# Patient Record
Sex: MALE | Race: WHITE | NOT HISPANIC OR LATINO | Employment: FULL TIME | URBAN - METROPOLITAN AREA
[De-identification: names, ages, dates, MRNs, and addresses within clinical notes are randomized per-mention and may not be internally consistent; named-entity substitution may affect disease eponyms.]

---

## 2019-04-24 PROBLEM — J30.9 ALLERGIC RHINITIS: Status: ACTIVE | Noted: 2019-04-24

## 2020-09-14 ENCOUNTER — LAB (OUTPATIENT)
Dept: LAB | Facility: HOSPITAL | Age: 25
End: 2020-09-14

## 2020-09-14 ENCOUNTER — TRANSCRIBE ORDERS (OUTPATIENT)
Dept: LAB | Facility: HOSPITAL | Age: 25
End: 2020-09-14

## 2020-09-14 DIAGNOSIS — F41.1 GENERALIZED ANXIETY DISORDER: ICD-10-CM

## 2020-09-14 DIAGNOSIS — F41.1 GENERALIZED ANXIETY DISORDER: Primary | ICD-10-CM

## 2020-09-14 LAB
AMPHET+METHAMPHET UR QL: NEGATIVE
AMPHETAMINES UR QL: NEGATIVE
BARBITURATES UR QL SCN: NEGATIVE
BENZODIAZ UR QL SCN: POSITIVE
BUPRENORPHINE SERPL-MCNC: NEGATIVE NG/ML
CANNABINOIDS SERPL QL: NEGATIVE
COCAINE UR QL: NEGATIVE
METHADONE UR QL SCN: NEGATIVE
OPIATES UR QL: NEGATIVE
OXYCODONE UR QL SCN: NEGATIVE
PCP UR QL SCN: NEGATIVE
PROPOXYPH UR QL: NEGATIVE
TRICYCLICS UR QL SCN: NEGATIVE

## 2020-09-14 PROCEDURE — 80306 DRUG TEST PRSMV INSTRMNT: CPT

## 2021-11-01 PROCEDURE — U0004 COV-19 TEST NON-CDC HGH THRU: HCPCS | Performed by: NURSE PRACTITIONER

## 2021-11-04 PROCEDURE — U0004 COV-19 TEST NON-CDC HGH THRU: HCPCS | Performed by: FAMILY MEDICINE

## 2022-05-17 ENCOUNTER — LAB (OUTPATIENT)
Dept: LAB | Facility: HOSPITAL | Age: 27
End: 2022-05-17

## 2022-05-17 ENCOUNTER — TRANSCRIBE ORDERS (OUTPATIENT)
Dept: LAB | Facility: HOSPITAL | Age: 27
End: 2022-05-17

## 2022-05-17 DIAGNOSIS — F31.81 BIPOLAR II DISORDER: Primary | ICD-10-CM

## 2022-05-17 DIAGNOSIS — F31.81 BIPOLAR II DISORDER: ICD-10-CM

## 2022-05-17 LAB
CHOLEST SERPL-MCNC: 200 MG/DL (ref 0–200)
HBA1C MFR BLD: 5 % (ref 4.8–5.6)
HDLC SERPL-MCNC: 55 MG/DL (ref 40–60)
LDLC SERPL CALC-MCNC: 131 MG/DL (ref 0–100)
LDLC/HDLC SERPL: 2.35 {RATIO}
TRIGL SERPL-MCNC: 78 MG/DL (ref 0–150)
VLDLC SERPL-MCNC: 14 MG/DL (ref 5–40)

## 2022-05-17 PROCEDURE — 80061 LIPID PANEL: CPT

## 2022-05-17 PROCEDURE — 36415 COLL VENOUS BLD VENIPUNCTURE: CPT

## 2022-05-17 PROCEDURE — 83036 HEMOGLOBIN GLYCOSYLATED A1C: CPT

## 2023-09-08 ENCOUNTER — LAB (OUTPATIENT)
Dept: LAB | Facility: HOSPITAL | Age: 28
End: 2023-09-08
Payer: COMMERCIAL

## 2023-09-08 ENCOUNTER — OFFICE VISIT (OUTPATIENT)
Dept: FAMILY MEDICINE CLINIC | Facility: CLINIC | Age: 28
End: 2023-09-08
Payer: COMMERCIAL

## 2023-09-08 VITALS
SYSTOLIC BLOOD PRESSURE: 148 MMHG | HEART RATE: 99 BPM | HEIGHT: 69 IN | WEIGHT: 189 LBS | DIASTOLIC BLOOD PRESSURE: 88 MMHG | BODY MASS INDEX: 27.99 KG/M2 | OXYGEN SATURATION: 96 %

## 2023-09-08 DIAGNOSIS — Z13.220 SCREENING, LIPID: ICD-10-CM

## 2023-09-08 DIAGNOSIS — Z11.59 ENCOUNTER FOR HCV SCREENING TEST FOR LOW RISK PATIENT: ICD-10-CM

## 2023-09-08 DIAGNOSIS — F31.81 BIPOLAR 2 DISORDER: ICD-10-CM

## 2023-09-08 DIAGNOSIS — R53.83 OTHER FATIGUE: ICD-10-CM

## 2023-09-08 DIAGNOSIS — Z11.4 SCREENING FOR HIV (HUMAN IMMUNODEFICIENCY VIRUS): ICD-10-CM

## 2023-09-08 DIAGNOSIS — Z00.00 ENCOUNTER FOR MEDICAL EXAMINATION TO ESTABLISH CARE: Primary | ICD-10-CM

## 2023-09-08 DIAGNOSIS — R79.89 ABNORMAL TSH: ICD-10-CM

## 2023-09-08 RX ORDER — LAMOTRIGINE 100 MG/1
200 TABLET ORAL DAILY
Qty: 60 TABLET | Refills: 5 | Status: SHIPPED | OUTPATIENT
Start: 2023-09-08

## 2023-09-08 RX ORDER — PROPRANOLOL HYDROCHLORIDE 10 MG/1
10 TABLET ORAL AS NEEDED
COMMUNITY

## 2023-09-08 NOTE — PROGRESS NOTES
"    Male Physical Note      Date: 2023   Patient Name: Luis Felipe Heaton  : 1995   MRN: [id number]     Chief Complaint:    Chief Complaint   Patient presents with    Annual Exam     fasting       History of Present Illness: Luis Felipe Heaton is a 27 y.o. male who is here today for annual exam/establish care.    HPI  Diagnosed with Bipolar Type 2 disorder 8 years ago.  States he was originally misdiagnosed with depression and started on Lexapro monotherapy.  This led to worsening depressive thoughts and ultimate hospitalization.  Was discharged same day and Lexapro was Dc'd.  Was then started on Lamictal which has worked well for him for the last several years.  Previously followed with [facility].  Within the past year he was tried on Latuda for a short period but came off earlier this year.  Denies any adverse effects from the Lamictal.  Feels like his mood symptoms are well controlled.  No further hospitalizations since that time.  Request that we take over prescribing medications.    Reports fatigue.  Ongoing issue w acute worsening over the last few months.  Experiences in the morning and worsens in the afternoon. Drinks \"a lot\" of caffeine during the day. Wakes up at 5:30 for cross fit and drinks a preworkout with 200 mg of caffeine. Then drinks iced coffee and another 1/2 pot of coffee at work.  In total gets between 400 to 500 mg of caffeine daily on average.  Reports that sleep is \"okay.\"  Occasionally has issues with both sleep onset and maintenance. Tries to get 7 to 8 hours most nights.  Will occasionally fall asleep while sitting at his desk at work.  Denies ever falling asleep when active or driving.  Reports that his father has a history of sleep issues and most recently saw sleep psychologist which he found helpful.  Patient does not feel that his fatigue is to the point of needing to see someone at this time.  Denies any personal or family history of anemia.  No signs and symptoms of " blood loss.    Subjective      Review of Systems:   Noted above in HPI    Past Medical History, Social History, Family History and Care Team were all reviewed with patient and updated as appropriate.     Medications:     Current Outpatient Medications:     lamoTRIgine (LaMICtal) 100 MG tablet, Take 2 tablets by mouth Daily., Disp: 60 tablet, Rfl: 5    propranolol (INDERAL) 10 MG tablet, Take 1 tablet by mouth As Needed., Disp: , Rfl:     Allergies:   Allergies   Allergen Reactions    Penicillins GI Intolerance       Immunizations:  Td/Tdap(Booster Q 10 yrs):  2016   Flu (Yearly):  Gets yearly flu vaccines.  Plans to get COVID booster.    Immunization History   Administered Date(s) Administered    COVID-19 (MODERNA) 1st,2nd,3rd Dose Monovalent 01/27/2021, 02/23/2021, 10/29/2021    COVID-19 (PFIZER) BIVALENT 12+YRS 10/16/2022    Flu Vaccine Split Quad 11/14/2020, 10/29/2021    Fluzone >6mos 11/14/2020, 10/29/2021    Hpv9 06/22/2022, 08/28/2022    Influenza Injectable Mdck Pf Quad 08/28/2022        Hep C (Age 18-79 once): Ordered today  HIV (Age 15-65 once) : Ordered today  A1c: Checked in 2022, 5.0%  Lipid panel: Ordered today  The ASCVD Risk score (Jorge Luis DK, et al., 2019) failed to calculate for the following reasons:    The 2019 ASCVD risk score is only valid for ages 40 to 79    Dermatology: Yes  Ophthalmologist: None, history of [facility]  Dentist: Twice yearly visits     Tobacco Use: High Risk    Smoking Tobacco Use: Some Days    Smokeless Tobacco Use: Unknown    Passive Exposure: Never       Social History     Substance and Sexual Activity   Alcohol Use Yes    Alcohol/week: 6.0 standard drinks    Types: 6 Cans of beer per week        Social History     Substance and Sexual Activity   Drug Use No        Diet/Physical activity: Reports balanced diet.  Works out for at least 60 minutes 5 days/week ([employer], [employer]).    Sexual health: Not currently sexually active.  May be interested in PrEP therapy in the future,  "declines today.  Sexually transmitted infections.    PHQ-2 Depression Screening  PHQ-9 Total Score: 0      Objective     Physical Exam:  Vital Signs:   Vitals:    09/08/23 1317   BP: 148/88  Comment: nervous   Pulse: 99   SpO2: 96%   Weight: 85.7 kg (189 lb)   Height: 175.3 cm (69\")     Body mass index is 27.91 kg/m².     Physical Exam  Vitals reviewed.   Constitutional:       General: He is not in acute distress.     Appearance: Normal appearance. He is normal weight.   HENT:      Head: Normocephalic and atraumatic.      Right Ear: Tympanic membrane normal.      Left Ear: Tympanic membrane normal.      Nose: Nose normal.      Mouth/Throat:      Mouth: Mucous membranes are moist.      Pharynx: Oropharynx is clear. No oropharyngeal exudate or posterior oropharyngeal erythema.   Eyes:      Extraocular Movements: Extraocular movements intact.      Conjunctiva/sclera: Conjunctivae normal.      Pupils: Pupils are equal, round, and reactive to light.   Cardiovascular:      Rate and Rhythm: Normal rate and regular rhythm.      Heart sounds: Normal heart sounds. No murmur heard.  Pulmonary:      Effort: Pulmonary effort is normal.      Breath sounds: No wheezing.   Abdominal:      General: Abdomen is flat.      Palpations: Abdomen is soft. There is no mass.      Tenderness: There is no abdominal tenderness.   Musculoskeletal:         General: Normal range of motion.      Cervical back: Normal range of motion and neck supple.   Lymphadenopathy:      Cervical: No cervical adenopathy.   Neurological:      General: No focal deficit present.      Mental Status: He is alert.   Psychiatric:         Mood and Affect: Mood normal.         Thought Content: Thought content normal.           Assessment / Plan      Assessment/Plan:   Diagnoses and all orders for this visit:    1. Encounter for medical examination to establish care (Primary)  -     Comprehensive Metabolic Panel; Future  -     CBC & Differential; Future  -     Lipid Panel " With / Chol / HDL Ratio; Future  -     TSH Rfx On Abnormal To Free T4; Future  -     Hemoglobin A1c; Future  -     HIV-1/O/2 Ag/Ab w Reflex; Future  -     Hepatitis C antibody; Future  -     Hepatitis C antibody  -     HIV-1/O/2 Ag/Ab w Reflex  -     Hemoglobin A1c  -     TSH Rfx On Abnormal To Free T4  -     Lipid Panel With / Chol / HDL Ratio  -     CBC & Differential  -     Comprehensive Metabolic Panel    2. Bipolar 2 disorder  -     lamoTRIgine (LaMICtal) 100 MG tablet; Take 2 tablets by mouth Daily.  Dispense: 60 tablet; Refill: 5    3. Encounter for HCV screening test for low risk patient  -     Hepatitis C antibody; Future  -     Hepatitis C antibody    4. Screening for HIV (human immunodeficiency virus)  -     HIV-1/O/2 Ag/Ab w Reflex; Future  -     HIV-1/O/2 Ag/Ab w Reflex    5. Screening, lipid    6. Other fatigue  -     Comprehensive Metabolic Panel; Future  -     CBC & Differential; Future  -     TSH Rfx On Abnormal To Free T4; Future  -     Vitamin B12; Future  -     Vitamin D 25 hydroxy; Future  -     Vitamin D 25 hydroxy  -     Vitamin B12  -     TSH Rfx On Abnormal To Free T4  -     CBC & Differential  -     Comprehensive Metabolic Panel       1.  Annual/health maintenance  - Age-appropriate health screening guidelines reviewed.  - Fasting labs ordered today  - HIV and HCV screening labs ordered  - BMI 27.  Discussed healthy diet and regular exercise  -Advised to get flu and COVID booster in the fall.  Tdap will be due in 2026.    2.  Bipolar type II  - Records requested from [facility]  - Refill Lamictal 200 mg daily.  Follow-up in 6 months.  - If mood symptoms become uncontrolled in the future discussed need for psychiatry referral at that time.    3.  Fatigue  - Discussed that fatigue is often multifactorial in nature.  No red flag symptoms reported in history.  - We will check labs to rule out vitamin deficiency, anemia or thyroid disorder  -Discussed continued physical activity, healthy  diet and optimizing sleep  - If labs are normal and symptoms fail to improve follow-up in 6 to 8 weeks    Healthcare Maintenance:  Counseling provided based on age appropriate USPSTF guidelines. Preventive counseling and anticipatory guidance discussed on the following topics: nutrition, physical activity, healthy weight, alcohol and drugs, dental health, mental health, and immunizations    BMI is >= 25 and <30. (Overweight) The following options were offered after discussion;: exercise counseling/recommendations and nutrition counseling/recommendations    Luis Felipe Heaton voices understanding and acceptance of this advice and will call back with any further questions or concerns. AVS with preventive healthcare tips printed for patient.         Follow Up:   Return in about 6 months (around 3/8/2024) for med check.          Karuna Serna DO  [facility] [place] [address]

## 2023-09-09 LAB
25(OH)D3+25(OH)D2 SERPL-MCNC: 48.3 NG/ML (ref 30–100)
ALBUMIN SERPL-MCNC: 5.1 G/DL (ref 3.5–5.2)
ALBUMIN/GLOB SERPL: 3 G/DL
ALP SERPL-CCNC: 82 U/L (ref 39–117)
ALT SERPL-CCNC: 22 U/L (ref 1–41)
AST SERPL-CCNC: 24 U/L (ref 1–40)
BASOPHILS # BLD AUTO: 0.05 10*3/MM3 (ref 0–0.2)
BASOPHILS NFR BLD AUTO: 0.7 % (ref 0–1.5)
BILIRUB SERPL-MCNC: 0.5 MG/DL (ref 0–1.2)
BUN SERPL-MCNC: 12 MG/DL (ref 6–20)
BUN/CREAT SERPL: 12.5 (ref 7–25)
CALCIUM SERPL-MCNC: 9.7 MG/DL (ref 8.6–10.5)
CHLORIDE SERPL-SCNC: 103 MMOL/L (ref 98–107)
CHOLEST SERPL-MCNC: 256 MG/DL (ref 0–200)
CHOLEST/HDLC SERPL: 3.66 {RATIO}
CO2 SERPL-SCNC: 22.6 MMOL/L (ref 22–29)
CREAT SERPL-MCNC: 0.96 MG/DL (ref 0.76–1.27)
EGFRCR SERPLBLD CKD-EPI 2021: 110.4 ML/MIN/1.73
EOSINOPHIL # BLD AUTO: 0.1 10*3/MM3 (ref 0–0.4)
EOSINOPHIL NFR BLD AUTO: 1.4 % (ref 0.3–6.2)
ERYTHROCYTE [DISTWIDTH] IN BLOOD BY AUTOMATED COUNT: 12.1 % (ref 12.3–15.4)
GLOBULIN SER CALC-MCNC: 1.7 GM/DL
GLUCOSE SERPL-MCNC: 93 MG/DL (ref 65–99)
HBA1C MFR BLD: 5.1 % (ref 4.8–5.6)
HCT VFR BLD AUTO: 47 % (ref 37.5–51)
HCV IGG SERPL QL IA: NON REACTIVE
HDLC SERPL-MCNC: 70 MG/DL (ref 40–60)
HGB BLD-MCNC: 16.4 G/DL (ref 13–17.7)
HIV 1+2 AB+HIV1 P24 AG SERPL QL IA: NON REACTIVE
IMM GRANULOCYTES # BLD AUTO: 0.01 10*3/MM3 (ref 0–0.05)
IMM GRANULOCYTES NFR BLD AUTO: 0.1 % (ref 0–0.5)
LDLC SERPL CALC-MCNC: 177 MG/DL (ref 0–100)
LYMPHOCYTES # BLD AUTO: 3.14 10*3/MM3 (ref 0.7–3.1)
LYMPHOCYTES NFR BLD AUTO: 44.6 % (ref 19.6–45.3)
MCH RBC QN AUTO: 32 PG (ref 26.6–33)
MCHC RBC AUTO-ENTMCNC: 34.9 G/DL (ref 31.5–35.7)
MCV RBC AUTO: 91.8 FL (ref 79–97)
MONOCYTES # BLD AUTO: 0.46 10*3/MM3 (ref 0.1–0.9)
MONOCYTES NFR BLD AUTO: 6.5 % (ref 5–12)
NEUTROPHILS # BLD AUTO: 3.28 10*3/MM3 (ref 1.7–7)
NEUTROPHILS NFR BLD AUTO: 46.7 % (ref 42.7–76)
NRBC BLD AUTO-RTO: 0 /100 WBC (ref 0–0.2)
PLATELET # BLD AUTO: 333 10*3/MM3 (ref 140–450)
POTASSIUM SERPL-SCNC: 3.6 MMOL/L (ref 3.5–5.2)
PROT SERPL-MCNC: 6.8 G/DL (ref 6–8.5)
RBC # BLD AUTO: 5.12 10*6/MM3 (ref 4.14–5.8)
SODIUM SERPL-SCNC: 140 MMOL/L (ref 136–145)
TRIGL SERPL-MCNC: 60 MG/DL (ref 0–150)
TSH SERPL DL<=0.005 MIU/L-ACNC: 4.47 UIU/ML (ref 0.27–4.2)
VIT B12 SERPL-MCNC: 510 PG/ML (ref 211–946)
VLDLC SERPL CALC-MCNC: 9 MG/DL (ref 5–40)
WBC # BLD AUTO: 7.04 10*3/MM3 (ref 3.4–10.8)

## 2023-09-14 ENCOUNTER — PATIENT ROUNDING (BHMG ONLY) (OUTPATIENT)
Dept: FAMILY MEDICINE CLINIC | Facility: CLINIC | Age: 28
End: 2023-09-14
Payer: COMMERCIAL

## 2024-03-08 ENCOUNTER — OFFICE VISIT (OUTPATIENT)
Dept: FAMILY MEDICINE CLINIC | Facility: CLINIC | Age: 29
End: 2024-03-08
Payer: COMMERCIAL

## 2024-03-08 VITALS
SYSTOLIC BLOOD PRESSURE: 124 MMHG | HEART RATE: 84 BPM | OXYGEN SATURATION: 99 % | BODY MASS INDEX: 28.05 KG/M2 | WEIGHT: 189.4 LBS | HEIGHT: 69 IN | DIASTOLIC BLOOD PRESSURE: 82 MMHG

## 2024-03-08 DIAGNOSIS — E78.00 ELEVATED LDL CHOLESTEROL LEVEL: ICD-10-CM

## 2024-03-08 DIAGNOSIS — F31.81 BIPOLAR 2 DISORDER: Primary | ICD-10-CM

## 2024-03-08 PROCEDURE — 99214 OFFICE O/P EST MOD 30 MIN: CPT | Performed by: STUDENT IN AN ORGANIZED HEALTH CARE EDUCATION/TRAINING PROGRAM

## 2024-03-08 RX ORDER — PROPRANOLOL HYDROCHLORIDE 10 MG/1
10 TABLET ORAL 2 TIMES DAILY PRN
Qty: 60 TABLET | Refills: 3 | Status: SHIPPED | OUTPATIENT
Start: 2024-03-08

## 2024-03-08 RX ORDER — LAMOTRIGINE 100 MG/1
200 TABLET ORAL DAILY
Qty: 180 TABLET | Refills: 1 | Status: SHIPPED | OUTPATIENT
Start: 2024-03-08 | End: 2024-09-04

## 2024-03-08 NOTE — ASSESSMENT & PLAN NOTE
Lab Results   Component Value Date    CHOL 200 05/17/2022    CHLPL 256 (H) 09/08/2023    TRIG 60 09/08/2023    HDL 70 (H) 09/08/2023     (H) 09/08/2023   Recheck FLP at annual  Discussed healthy diet, regular physical activity  Dietary info provided in AVS

## 2024-03-08 NOTE — PROGRESS NOTES
Chief Complaint   Patient presents with    Medication Follow up       HPI:  Luis Felipe Heaton is a 28 y.o. male who presents today for FU Bipolar D/O.     Pt has been doing well on current med regimen. Takes Lamictal 200mg daily and Propranolol 10mg PRN for Bipolar 2 d/o. Reports mood symptoms are doing well.   No acute concerns today.    Labs are reviewed from 6 months ago. LDL was elevated 177. Pt has been working to eat a healthier diet and exercising more. Wt is stable. Would like to hold off until summer to have this repeated since his company is switching insurance carriers.     TSH was mildly elevated. Denies hyper or hypothyroid symptoms.      PE:  Vitals:    03/08/24 0811   BP: 124/82   Pulse: 84   SpO2: 99%      Body mass index is 27.96 kg/m².    Gen Appearance: NAD  HEENT: Normocephalic, EOMI, no thyromegaly, trachea midline  Heart: RRR, normal S1 and S2, no murmur  Lungs: CTA b/l, no wheezing, no crackles  MSK: Moves all extremities well, normal gait, no peripheral edema  Neuro: No focal deficits    Current Outpatient Medications   Medication Sig Dispense Refill    lamoTRIgine (LaMICtal) 100 MG tablet Take 2 tablets by mouth Daily for 180 days. 180 tablet 1    propranolol (INDERAL) 10 MG tablet Take 1 tablet by mouth 2 (Two) Times a Day As Needed (anxiety). 60 tablet 3     No current facility-administered medications for this visit.        A/P:  Diagnoses and all orders for this visit:    1. Bipolar 2 disorder (Primary)  Assessment & Plan:  Controlled   Cont Lamictal, Propranolol PRN  Fu in 6 months    Orders:  -     lamoTRIgine (LaMICtal) 100 MG tablet; Take 2 tablets by mouth Daily for 180 days.  Dispense: 180 tablet; Refill: 1  -     propranolol (INDERAL) 10 MG tablet; Take 1 tablet by mouth 2 (Two) Times a Day As Needed (anxiety).  Dispense: 60 tablet; Refill: 3    2. Elevated LDL cholesterol level  Assessment & Plan:  Lab Results   Component Value Date    CHOL 200 05/17/2022    CHLPL 256 (H) 09/08/2023     TRIG 60 09/08/2023    HDL 70 (H) 09/08/2023     (H) 09/08/2023   Recheck FLP at annual  Discussed healthy diet, regular physical activity  Dietary info provided in AVS           Return in about 6 months (around 9/8/2024) for Annual.     Dictated Utilizing Dragon Dictation    Please note that portions of this note were completed with a voice recognition program.    Part of this note may be an electronic transcription/translation of spoken language to printed text using the Dragon Dictation System.

## 2024-09-08 NOTE — PROGRESS NOTES
Male Physical Note      Date: 2024   Patient Name: Luis Felipe Heaton  : 1995   MRN: [id number]     Chief Complaint:    Chief Complaint   Patient presents with    Annual Exam       History of Present Illness: Luis Felipe Heaton is a 28 y.o. male with Bipolar type 2 d/o who is here today for their annual health maintenance and physical.    HPI  No acute concerns today.    Pt has been doing well on current med regimen. Takes Lamictal 200mg daily and Propranolol 10mg PRN for Bipolar 2 d/o. Reports mood symptoms are doing well on this regimen. Previously followed with [facility]. Rare use of Propranolol, no refills needed today.     Occasionally feels like blood sugar drops. Has family h/o diabetes. Would like A1c checked.     Labs are reviewed from last year- LDL was elevated 177. Due for repeat today.   TSH was mildly elevated. No hyper or hypothyroid symptoms.    Ran left pinky toe into cement block a couple of weeks ago. Has some pain and swelling in the toe. Pain has improved. Plays the organ and it sometimes bothers hinm     Subjective      Review of Systems:   Review of Systems   Constitutional:  Negative for activity change.   Respiratory:  Negative for shortness of breath.    Cardiovascular:  Negative for chest pain.   Gastrointestinal:  Negative for constipation.       Past Medical History, Social History, Family History and Care Team were all reviewed with patient and updated as appropriate.     Medications:     Current Outpatient Medications:     lamoTRIgine (LaMICtal) 100 MG tablet, Take 2 tablets by mouth Daily., Disp: 180 tablet, Rfl: 3    propranolol (INDERAL) 10 MG tablet, Take 1 tablet by mouth 2 (Two) Times a Day As Needed (anxiety)., Disp: 60 tablet, Rfl: 3    Allergies:   Allergies   Allergen Reactions    Penicillins GI Intolerance       Immunizations:  Td/Tdap(Booster Q 10 yrs):  2016  Flu (Yearly):  Reminded to get this Fall  Hep B: Had childhood vaccination series. Declines   "update or titer check at this time.   Immunization History   Administered Date(s) Administered    COVID-19 (MODERNA) 1st,2nd,3rd Dose Monovalent 01/27/2021, 02/23/2021, 10/29/2021    COVID-19 (PFIZER) BIVALENT 12+YRS 10/16/2022    Flu Vaccine Split Quad 11/14/2020, 10/29/2021    Fluzone (or Fluarix & Flulaval for VFC) >6mos 11/14/2020, 10/29/2021    Hpv9 06/22/2022, 08/28/2022    Influenza Injectable Mdck Pf Quad 08/28/2022      CT for Smoker (Age 50-80, 20pk yr within last 15 years)/AAA Screen:  Never smoker  Hep C (Age 18-79 once):  Previously negative  HIV (Age 15-65 once) : Previously negative  A1c: Ordered  Lipid panel:   Lab Results   Component Value Date    CHOL 200 05/17/2022    CHLPL 256 (H) 09/08/2023    TRIG 60 09/08/2023    HDL 70 (H) 09/08/2023     (H) 09/08/2023       The ASCVD Risk score (Jorge Luis BILL, et al., 2019) failed to calculate for the following reasons:    The 2019 ASCVD risk score is only valid for ages 40 to 79    Tobacco Use: Medium Risk (9/9/2024)    Patient History     Smoking Tobacco Use: Never     Smokeless Tobacco Use: Never     Passive Exposure: Yes       Social History     Substance and Sexual Activity   Alcohol Use Yes    Alcohol/week: 6.0 standard drinks of alcohol        Social History     Substance and Sexual Activity   Drug Use No        Diet/Physical activity: Overall healthy. Occasionally eats high sugar content snacks. Exercises regularly.     Sexual health: Not currently sexually active, contraception used    PHQ-2 Depression Screening  PHQ-9 Total Score:          Objective     Physical Exam:  Vital Signs:   Vitals:    09/09/24 0748   BP: 138/88   Pulse: 84   SpO2: 99%   Weight: 88.8 kg (195 lb 12.8 oz)   Height: 175.3 cm (69.02\")   PainSc: 0-No pain     Body mass index is 28.9 kg/m².     Physical Exam  Vitals reviewed.   Constitutional:       General: He is not in acute distress.     Appearance: Normal appearance. He is normal weight.   HENT:      Head: Normocephalic " and atraumatic.      Nose: Nose normal.      Mouth/Throat:      Mouth: Mucous membranes are moist.      Pharynx: Oropharynx is clear. No oropharyngeal exudate or posterior oropharyngeal erythema.   Eyes:      Extraocular Movements: Extraocular movements intact.      Conjunctiva/sclera: Conjunctivae normal.      Pupils: Pupils are equal, round, and reactive to light.   Cardiovascular:      Rate and Rhythm: Normal rate and regular rhythm.      Heart sounds: Normal heart sounds. No murmur heard.  Pulmonary:      Effort: Pulmonary effort is normal.      Breath sounds: No wheezing.   Abdominal:      General: Abdomen is flat. Bowel sounds are normal.      Palpations: Abdomen is soft. There is no mass.      Tenderness: There is no abdominal tenderness.   Musculoskeletal:         General: Normal range of motion.      Cervical back: Normal range of motion and neck supple.      Comments: Some mild swelling, redness noted to left pinky toe. DIP is TTP but ROM is normal. No tenderness noted at base of 5th metatarsal.   Lymphadenopathy:      Cervical: No cervical adenopathy.   Neurological:      General: No focal deficit present.      Mental Status: He is alert.   Psychiatric:         Mood and Affect: Mood normal.         Thought Content: Thought content normal.         Assessment / Plan      Assessment/Plan:   Diagnoses and all orders for this visit:    1. Annual physical exam (Primary)  Assessment & Plan:  Age appropriate screenings and recommendations reviewed  Fasting labs today  Reminded to get annual flu vaccine  Cont heathy diet, regular physical activity  Declines Hep B series or titer check for now  Not currently sexually active    Orders:  -     Lipid panel; Future  -     TSH Rfx On Abnormal To Free T4; Future  -     CBC & Differential; Future  -     Comprehensive metabolic panel; Future  -     Hemoglobin A1c; Future    2. Elevated LDL cholesterol level  -     Lipid panel; Future    3. Abnormal TSH  -     TSH Rfx On  Abnormal To Free T4; Future    4. Bipolar 2 disorder  Assessment & Plan:  Controlled   Cont Lamictal, Propranolol PRN  Fu in 6 months    Orders:  -     lamoTRIgine (LaMICtal) 100 MG tablet; Take 2 tablets by mouth Daily.  Dispense: 180 tablet; Refill: 3    5. Family history of diabetes mellitus  -     Hemoglobin A1c; Future         Healthcare Maintenance:  Counseling provided based on age appropriate USPSTF guidelines. Preventive counseling and anticipatory guidance discussed on the following topics: nutrition, physical activity, healthy weight, sexual behavior and STDs, mental health, and immunizations    BMI is >= 25 and <30. (Overweight) The following options were offered after discussion;: exercise counseling/recommendations and nutrition counseling/recommendations    Luis Felipe Heaton voices understanding and acceptance of this advice and will call back with any further questions or concerns. AVS with preventive healthcare tips printed for patient.         Follow Up:   Return in about 1 year (around 9/9/2025) for Annual.          Karuna Serna DO  [facility] – [place] [address]

## 2024-09-09 ENCOUNTER — OFFICE VISIT (OUTPATIENT)
Dept: FAMILY MEDICINE CLINIC | Facility: CLINIC | Age: 29
End: 2024-09-09
Payer: COMMERCIAL

## 2024-09-09 ENCOUNTER — LAB (OUTPATIENT)
Dept: LAB | Facility: HOSPITAL | Age: 29
End: 2024-09-09
Payer: COMMERCIAL

## 2024-09-09 VITALS
HEART RATE: 84 BPM | BODY MASS INDEX: 29 KG/M2 | OXYGEN SATURATION: 99 % | WEIGHT: 195.8 LBS | DIASTOLIC BLOOD PRESSURE: 88 MMHG | SYSTOLIC BLOOD PRESSURE: 138 MMHG | HEIGHT: 69 IN

## 2024-09-09 DIAGNOSIS — E78.00 ELEVATED LDL CHOLESTEROL LEVEL: ICD-10-CM

## 2024-09-09 DIAGNOSIS — Z00.00 ANNUAL PHYSICAL EXAM: Primary | ICD-10-CM

## 2024-09-09 DIAGNOSIS — R79.89 ABNORMAL TSH: ICD-10-CM

## 2024-09-09 DIAGNOSIS — Z83.3 FAMILY HISTORY OF DIABETES MELLITUS: ICD-10-CM

## 2024-09-09 DIAGNOSIS — F31.81 BIPOLAR 2 DISORDER: ICD-10-CM

## 2024-09-09 DIAGNOSIS — Z00.00 ANNUAL PHYSICAL EXAM: ICD-10-CM

## 2024-09-09 LAB
ALBUMIN SERPL-MCNC: 5.1 G/DL (ref 3.5–5.2)
ALBUMIN/GLOB SERPL: 2.1 G/DL
ALP SERPL-CCNC: 83 U/L (ref 39–117)
ALT SERPL W P-5'-P-CCNC: 19 U/L (ref 1–41)
ANION GAP SERPL CALCULATED.3IONS-SCNC: 14 MMOL/L (ref 5–15)
AST SERPL-CCNC: 22 U/L (ref 1–40)
BASOPHILS # BLD AUTO: 0.06 10*3/MM3 (ref 0–0.2)
BASOPHILS NFR BLD AUTO: 0.7 % (ref 0–1.5)
BILIRUB SERPL-MCNC: 0.3 MG/DL (ref 0–1.2)
BUN SERPL-MCNC: 11 MG/DL (ref 6–20)
BUN/CREAT SERPL: 9.5 (ref 7–25)
CALCIUM SPEC-SCNC: 10.2 MG/DL (ref 8.6–10.5)
CHLORIDE SERPL-SCNC: 99 MMOL/L (ref 98–107)
CHOLEST SERPL-MCNC: 268 MG/DL (ref 0–200)
CO2 SERPL-SCNC: 26 MMOL/L (ref 22–29)
CREAT SERPL-MCNC: 1.16 MG/DL (ref 0.76–1.27)
DEPRECATED RDW RBC AUTO: 40.3 FL (ref 37–54)
EGFRCR SERPLBLD CKD-EPI 2021: 87.4 ML/MIN/1.73
EOSINOPHIL # BLD AUTO: 0.25 10*3/MM3 (ref 0–0.4)
EOSINOPHIL NFR BLD AUTO: 3.1 % (ref 0.3–6.2)
ERYTHROCYTE [DISTWIDTH] IN BLOOD BY AUTOMATED COUNT: 12 % (ref 12.3–15.4)
GLOBULIN UR ELPH-MCNC: 2.4 GM/DL
GLUCOSE SERPL-MCNC: 100 MG/DL (ref 65–99)
HBA1C MFR BLD: 5.1 % (ref 4.8–5.6)
HCT VFR BLD AUTO: 49.7 % (ref 37.5–51)
HDLC SERPL-MCNC: 73 MG/DL (ref 40–60)
HGB BLD-MCNC: 17 G/DL (ref 13–17.7)
IMM GRANULOCYTES # BLD AUTO: 0.02 10*3/MM3 (ref 0–0.05)
IMM GRANULOCYTES NFR BLD AUTO: 0.2 % (ref 0–0.5)
LDLC SERPL CALC-MCNC: 177 MG/DL (ref 0–100)
LDLC/HDLC SERPL: 2.38 {RATIO}
LYMPHOCYTES # BLD AUTO: 2.88 10*3/MM3 (ref 0.7–3.1)
LYMPHOCYTES NFR BLD AUTO: 35.7 % (ref 19.6–45.3)
MCH RBC QN AUTO: 31.6 PG (ref 26.6–33)
MCHC RBC AUTO-ENTMCNC: 34.2 G/DL (ref 31.5–35.7)
MCV RBC AUTO: 92.4 FL (ref 79–97)
MONOCYTES # BLD AUTO: 0.54 10*3/MM3 (ref 0.1–0.9)
MONOCYTES NFR BLD AUTO: 6.7 % (ref 5–12)
NEUTROPHILS NFR BLD AUTO: 4.31 10*3/MM3 (ref 1.7–7)
NEUTROPHILS NFR BLD AUTO: 53.6 % (ref 42.7–76)
NRBC BLD AUTO-RTO: 0 /100 WBC (ref 0–0.2)
PLATELET # BLD AUTO: 325 10*3/MM3 (ref 140–450)
PMV BLD AUTO: 10.7 FL (ref 6–12)
POTASSIUM SERPL-SCNC: 3.8 MMOL/L (ref 3.5–5.2)
PROT SERPL-MCNC: 7.5 G/DL (ref 6–8.5)
RBC # BLD AUTO: 5.38 10*6/MM3 (ref 4.14–5.8)
SODIUM SERPL-SCNC: 139 MMOL/L (ref 136–145)
T4 FREE SERPL-MCNC: 1.2 NG/DL (ref 0.92–1.68)
TRIGL SERPL-MCNC: 107 MG/DL (ref 0–150)
TSH SERPL DL<=0.05 MIU/L-ACNC: 4.55 UIU/ML (ref 0.27–4.2)
VLDLC SERPL-MCNC: 18 MG/DL (ref 5–40)
WBC NRBC COR # BLD AUTO: 8.06 10*3/MM3 (ref 3.4–10.8)

## 2024-09-09 PROCEDURE — 80061 LIPID PANEL: CPT

## 2024-09-09 PROCEDURE — 85025 COMPLETE CBC W/AUTO DIFF WBC: CPT

## 2024-09-09 PROCEDURE — 80053 COMPREHEN METABOLIC PANEL: CPT

## 2024-09-09 PROCEDURE — 84439 ASSAY OF FREE THYROXINE: CPT

## 2024-09-09 PROCEDURE — 83036 HEMOGLOBIN GLYCOSYLATED A1C: CPT

## 2024-09-09 PROCEDURE — 36415 COLL VENOUS BLD VENIPUNCTURE: CPT

## 2024-09-09 PROCEDURE — 84443 ASSAY THYROID STIM HORMONE: CPT

## 2024-09-09 PROCEDURE — 99395 PREV VISIT EST AGE 18-39: CPT | Performed by: STUDENT IN AN ORGANIZED HEALTH CARE EDUCATION/TRAINING PROGRAM

## 2024-09-09 RX ORDER — LAMOTRIGINE 100 MG/1
200 TABLET ORAL DAILY
Qty: 180 TABLET | Refills: 3 | Status: SHIPPED | OUTPATIENT
Start: 2024-09-09

## 2024-09-09 NOTE — ASSESSMENT & PLAN NOTE
Age appropriate screenings and recommendations reviewed  Fasting labs today  Reminded to get annual flu vaccine  Cont heathy diet, regular physical activity  Declines Hep B series or titer check for now  Not currently sexually active

## 2024-09-10 ENCOUNTER — PATIENT MESSAGE (OUTPATIENT)
Dept: FAMILY MEDICINE CLINIC | Facility: CLINIC | Age: 29
End: 2024-09-10
Payer: COMMERCIAL

## 2024-09-10 DIAGNOSIS — E78.00 ELEVATED LDL CHOLESTEROL LEVEL: Primary | ICD-10-CM

## 2024-09-10 NOTE — TELEPHONE ENCOUNTER
"From: Karuna Serna  To: Luis Felipe Heaton  Sent: 9/10/2024 6:27 AM EDT  Subject: Blood work results    Luis Felipe,    Your blood work shows that cholesterol levels remain elevated. LDL or \"bad\" cholesterol is high at 177, goal is < 100. Your \"good\" cholesterol, HDL is excellent at 73. With LDL being at this level, we could consider starting a medication (known as statin drugs) to help lower this value and reduce cardiovascular risk. In patients under the age of 40, guidelines are not well defined for use of statin drugs, but can be considered with LDL level above 160. Please let me know if you are OK with starting medication and I will send a prescription to the pharmacy. I have also included some dietary tips below.    Otherwise, the rest of your blood work looks good. Thyroid function remains stable. Blood sugar is mildly elevated, but A1c is within normal range. Blood counts are normal.    Let me know if you have any questions.     Best,  Karuna Serna, DO     FAT  Limit total intake of fats and oils.  Avoid butter, stick margarine, shortening, lard, palm and coconut oils.  Limit mayonnaise, salad dressings, gravies and sauces, unless they are homemade with low-fat ingredients.  Limit chocolate.  Choose low-fat and nonfat products, such as low-fat mayonnaise, low-fat or non-hydrogenated peanut butter, low-fat or fat-free salad dressings and nonfat gravy.  Use vegetable oil, such as canola or olive oil.  Look for margarine that does not contain trans fatty acids.  Use nuts in moderate amounts.  Read ingredient labels carefully to determine both amount and type of fat present in foods. Limit saturated and trans fats.  Avoid high-fat processed and convenience foods.    MEATS AND ALTERNATIVES  Choose fish, chicken, turkey and lean meats.  Use dried beans, peas, lentils and tofu.  Limit egg yolks to three to four per week.  If you eat red meat, limit to no more than three servings per week and choose loin or round " "cuts.  Avoid fatty meats, such as rangel, sausage, wesley, luncheon meats and ribs.  Avoid all organ meats, including liver.    DAIRY  Choose nonfat or low-fat milk, yogurt and cottage cheese.  Most cheeses are high in fat. Choose cheeses made from non-fat milk, such as mozzarella and ricotta cheese.  Choose light or fat-free cream cheese and sour cream.  Avoid cream and sauces made with cream.  Fruits and Vegetables  Eat a wide variety of fruits and vegetables.  Use lemon juice, vinegar or \"mist\" olive oil on vegetables.  Avoid adding sauces, fat or oil to vegetables.  Breads, Cereals and Grains  Choose whole-grain breads, cereals, pastas and rice.  Avoid high-fat snack foods, such as granola, cookies, pies, pastries, doughnuts and croissants.    COOKING TIPS  Avoid deep fried foods.  Trim visible fat off meats and remove skin from poultry before cooking.  Bake, broil, boil, poach or roast poultry, fish and lean meats.  Drain and discard fat that drains out of meat as you cook it.  Add little or no fat to foods.  Use vegetable oil sprays to grease pans for cooking or baking.  Steam vegetables.  Use herbs or no-oil marinades to flavor foods.  "

## 2024-09-11 RX ORDER — ROSUVASTATIN CALCIUM 5 MG/1
5 TABLET, COATED ORAL DAILY
Qty: 90 TABLET | Refills: 1 | Status: SHIPPED | OUTPATIENT
Start: 2024-09-11

## 2024-11-18 ENCOUNTER — LAB (OUTPATIENT)
Dept: LAB | Facility: HOSPITAL | Age: 29
End: 2024-11-18
Payer: COMMERCIAL

## 2024-11-18 DIAGNOSIS — E78.00 ELEVATED LDL CHOLESTEROL LEVEL: ICD-10-CM

## 2024-11-18 LAB
ALBUMIN SERPL-MCNC: 4.9 G/DL (ref 3.5–5.2)
ALBUMIN/GLOB SERPL: 2.3 G/DL
ALP SERPL-CCNC: 74 U/L (ref 39–117)
ALT SERPL W P-5'-P-CCNC: 23 U/L (ref 1–41)
ANION GAP SERPL CALCULATED.3IONS-SCNC: 12.6 MMOL/L (ref 5–15)
AST SERPL-CCNC: 25 U/L (ref 1–40)
BILIRUB SERPL-MCNC: 0.3 MG/DL (ref 0–1.2)
BUN SERPL-MCNC: 11 MG/DL (ref 6–20)
BUN/CREAT SERPL: 10 (ref 7–25)
CALCIUM SPEC-SCNC: 9.5 MG/DL (ref 8.6–10.5)
CHLORIDE SERPL-SCNC: 102 MMOL/L (ref 98–107)
CHOLEST SERPL-MCNC: 189 MG/DL (ref 0–200)
CO2 SERPL-SCNC: 25.4 MMOL/L (ref 22–29)
CREAT SERPL-MCNC: 1.1 MG/DL (ref 0.76–1.27)
EGFRCR SERPLBLD CKD-EPI 2021: 92.6 ML/MIN/1.73
GLOBULIN UR ELPH-MCNC: 2.1 GM/DL
GLUCOSE SERPL-MCNC: 96 MG/DL (ref 65–99)
HDLC SERPL-MCNC: 75 MG/DL (ref 40–60)
LDLC SERPL CALC-MCNC: 101 MG/DL (ref 0–100)
LDLC/HDLC SERPL: 1.34 {RATIO}
POTASSIUM SERPL-SCNC: 4.5 MMOL/L (ref 3.5–5.2)
PROT SERPL-MCNC: 7 G/DL (ref 6–8.5)
SODIUM SERPL-SCNC: 140 MMOL/L (ref 136–145)
TRIGL SERPL-MCNC: 69 MG/DL (ref 0–150)
VLDLC SERPL-MCNC: 13 MG/DL (ref 5–40)

## 2024-11-18 PROCEDURE — 80053 COMPREHEN METABOLIC PANEL: CPT

## 2024-11-18 PROCEDURE — 80061 LIPID PANEL: CPT

## 2024-11-18 PROCEDURE — 36415 COLL VENOUS BLD VENIPUNCTURE: CPT

## 2024-12-12 DIAGNOSIS — E78.00 ELEVATED LDL CHOLESTEROL LEVEL: ICD-10-CM

## 2024-12-12 RX ORDER — ROSUVASTATIN CALCIUM 5 MG/1
5 TABLET, COATED ORAL DAILY
Qty: 90 TABLET | Refills: 1 | OUTPATIENT
Start: 2024-12-12

## 2025-03-03 DIAGNOSIS — E78.00 ELEVATED LDL CHOLESTEROL LEVEL: ICD-10-CM

## 2025-03-03 RX ORDER — ROSUVASTATIN CALCIUM 5 MG/1
5 TABLET, COATED ORAL DAILY
Qty: 90 TABLET | Refills: 1 | Status: SHIPPED | OUTPATIENT
Start: 2025-03-03

## 2025-03-03 NOTE — TELEPHONE ENCOUNTER
Rx Refill Note  Requested Prescriptions     Pending Prescriptions Disp Refills    rosuvastatin (Crestor) 5 MG tablet 90 tablet 1     Sig: Take 1 tablet by mouth Daily.      Last office visit with prescribing clinician: 9/9/2024   Last telemedicine visit with prescribing clinician: Visit date not found   Next office visit with prescribing clinician: 9/10/2025                         Would you like a call back once the refill request has been completed: [] Yes [] No    If the office needs to give you a call back, can they leave a voicemail: [] Yes [] No    Roxana Redding MA  03/03/25, 10:17 EST

## 2025-08-28 DIAGNOSIS — E78.00 ELEVATED LDL CHOLESTEROL LEVEL: ICD-10-CM

## 2025-08-28 RX ORDER — ROSUVASTATIN CALCIUM 5 MG/1
5 TABLET, COATED ORAL DAILY
Qty: 90 TABLET | Refills: 1 | Status: SHIPPED | OUTPATIENT
Start: 2025-08-28

## 2025-08-29 DIAGNOSIS — F31.81 BIPOLAR 2 DISORDER: ICD-10-CM

## 2025-08-29 DIAGNOSIS — E78.00 ELEVATED LDL CHOLESTEROL LEVEL: ICD-10-CM

## 2025-08-29 RX ORDER — LAMOTRIGINE 100 MG/1
200 TABLET ORAL DAILY
Qty: 180 TABLET | Refills: 3 | Status: SHIPPED | OUTPATIENT
Start: 2025-08-29

## 2025-08-29 RX ORDER — ROSUVASTATIN CALCIUM 5 MG/1
5 TABLET, COATED ORAL DAILY
Qty: 90 TABLET | Refills: 1 | Status: CANCELLED | OUTPATIENT
Start: 2025-08-29